# Patient Record
Sex: FEMALE | Race: WHITE | NOT HISPANIC OR LATINO | Employment: FULL TIME | URBAN - METROPOLITAN AREA
[De-identification: names, ages, dates, MRNs, and addresses within clinical notes are randomized per-mention and may not be internally consistent; named-entity substitution may affect disease eponyms.]

---

## 2017-11-27 ENCOUNTER — HOSPITAL ENCOUNTER (OUTPATIENT)
Dept: MAMMOGRAPHY | Facility: HOSPITAL | Age: 47
Discharge: HOME | End: 2017-11-27
Attending: OBSTETRICS & GYNECOLOGY | Admitting: OBSTETRICS & GYNECOLOGY

## 2017-11-27 DIAGNOSIS — R92.8 ABNORMAL MAMMOGRAM: ICD-10-CM

## 2017-11-27 PROCEDURE — G0279 TOMOSYNTHESIS, MAMMO: HCPCS

## 2017-11-27 PROCEDURE — 77066 DX MAMMO INCL CAD BI: CPT | Performed by: RADIOLOGY

## 2017-11-27 PROCEDURE — 77062 BREAST TOMOSYNTHESIS BI: CPT | Performed by: RADIOLOGY

## 2017-11-27 PROCEDURE — G0204 DX MAMMO INCL CAD BI: HCPCS

## 2018-10-03 ENCOUNTER — TRANSCRIBE ORDERS (OUTPATIENT)
Dept: ADMINISTRATIVE | Facility: HOSPITAL | Age: 48
End: 2018-10-03

## 2018-10-03 DIAGNOSIS — Z12.31 VISIT FOR SCREENING MAMMOGRAM: Primary | ICD-10-CM

## 2018-12-20 ENCOUNTER — APPOINTMENT (OUTPATIENT)
Dept: MAMMOGRAPHY | Facility: HOSPITAL | Age: 48
End: 2018-12-20
Attending: OBSTETRICS & GYNECOLOGY

## 2019-03-06 ENCOUNTER — HOSPITAL ENCOUNTER (OUTPATIENT)
Dept: MAMMOGRAPHY | Facility: HOSPITAL | Age: 49
Discharge: HOME | End: 2019-03-06
Attending: OBSTETRICS & GYNECOLOGY | Admitting: OBSTETRICS & GYNECOLOGY

## 2019-03-06 DIAGNOSIS — Z12.31 VISIT FOR SCREENING MAMMOGRAM: ICD-10-CM

## 2019-03-06 PROCEDURE — 77067 SCR MAMMO BI INCL CAD: CPT | Performed by: RADIOLOGY

## 2019-03-06 PROCEDURE — 77067 SCR MAMMO BI INCL CAD: CPT

## 2019-03-06 PROCEDURE — 77063 BREAST TOMOSYNTHESIS BI: CPT

## 2019-03-06 PROCEDURE — 77063 BREAST TOMOSYNTHESIS BI: CPT | Performed by: RADIOLOGY

## 2020-02-04 ENCOUNTER — TRANSCRIBE ORDERS (OUTPATIENT)
Dept: ADMINISTRATIVE | Facility: HOSPITAL | Age: 50
End: 2020-02-04

## 2020-02-04 DIAGNOSIS — Z12.31 VISIT FOR SCREENING MAMMOGRAM: Primary | ICD-10-CM

## 2020-05-14 ENCOUNTER — APPOINTMENT (OUTPATIENT)
Dept: MAMMOGRAPHY | Facility: HOSPITAL | Age: 50
End: 2020-05-14

## 2020-09-11 ENCOUNTER — TRANSCRIBE ORDERS (OUTPATIENT)
Dept: ADMINISTRATIVE | Facility: HOSPITAL | Age: 50
End: 2020-09-11

## 2020-09-11 DIAGNOSIS — Z12.31 VISIT FOR SCREENING MAMMOGRAM: Primary | ICD-10-CM

## 2020-10-15 ENCOUNTER — CONSULT (OUTPATIENT)
Dept: CARDIOLOGY | Facility: CLINIC | Age: 50
End: 2020-10-15

## 2020-10-15 VITALS
TEMPERATURE: 96.9 F | BODY MASS INDEX: 31.65 KG/M2 | SYSTOLIC BLOOD PRESSURE: 138 MMHG | DIASTOLIC BLOOD PRESSURE: 84 MMHG | WEIGHT: 190 LBS | HEIGHT: 65 IN | HEART RATE: 53 BPM | OXYGEN SATURATION: 99 %

## 2020-10-15 DIAGNOSIS — E78.5 DYSLIPIDEMIA: ICD-10-CM

## 2020-10-15 DIAGNOSIS — R94.31 ABNORMAL EKG: Primary | ICD-10-CM

## 2020-10-15 PROCEDURE — 99243 OFF/OP CNSLTJ NEW/EST LOW 30: CPT | Performed by: PHYSICIAN ASSISTANT

## 2020-10-15 PROCEDURE — 93000 ELECTROCARDIOGRAM COMPLETE: CPT | Performed by: PHYSICIAN ASSISTANT

## 2020-10-15 RX ORDER — ASPIRIN 81 MG/1
81 TABLET ORAL DAILY
COMMUNITY

## 2020-10-15 RX ORDER — QUINIDINE SULFATE 200 MG
125 TABLET ORAL DAILY
COMMUNITY

## 2020-10-15 RX ORDER — PHENOL 1.4 %
600 AEROSOL, SPRAY (ML) MUCOUS MEMBRANE DAILY
COMMUNITY

## 2020-10-15 RX ORDER — THIAMINE HCL 100 MG
2500 TABLET ORAL DAILY
COMMUNITY

## 2020-10-15 RX ORDER — HYDROXYCHLOROQUINE SULFATE 200 MG/1
200 TABLET, FILM COATED ORAL DAILY
COMMUNITY

## 2020-10-15 NOTE — PROGRESS NOTES
"     [facility] - Office Note  Andres Gómez         [address] [place] [address] [address] [place] [address]  1970   [phone] (home)      LOCATION:  [facility].  Visit Type: Consult.    PCP:  Bere Marvin MD    10/15/20   Andres Gómez is a 50 y.o.  female  currently employed.      Chief Complaint: Evaluation of abnormal EKG    Problem List/PMHx:   1.  Abnormal EKG  2.  Dyslipidemia  3.  Family Hx Sjogren's       No Known Allergies      Current Outpatient Medications:   •  aspirin 81 MG EC tablet, Take 81 mg by mouth Daily., Disp: , Rfl:   •  calcium carbonate (OS-ROXANA) 600 MG tablet, Take 600 mg by mouth Daily., Disp: , Rfl:   •  hydroxychloroquine (PLAQUENIL) 200 MG tablet, Take 200 mg by mouth Daily., Disp: , Rfl:   •  Multiple Vitamins-Minerals (Vitamin D3 Complete) tablet, Take 125 mcg by mouth Daily., Disp: , Rfl:   •  vitamin B-12 (CYANOCOBALAMIN) 2500 MCG sublingual tablet tablet, Place 2,500 mcg under the tongue Daily., Disp: , Rfl:     HPI  Andres Gómez is here today in consultation for EKG changes/abnormal EKG.  She was following with her PCP and EKG performed showed a new finding of \"ST depression.\"  Her EKG today shows same non specific abnormality.  She states she has never had complaints of chest pain, no anginal equivalent dyspnea or weakness.  She denies any change to her routine health and activity.  She had a stress test ~ 2010 for an \"irregular heart beat\" and reports that it was normal.  She is not aware of any family history of CAD or cardiac risk factors.  She recently had blood work with her PCP and was notified that her lipid panel was abnormal.  PCP wants to start a statin but the patient is hesitant and wants to further discuss.            The following portions of the patient's history were reviewed in the chart and updated as appropriate: allergies, current medications, past family history, past medical history, past social " "history, past surgical history and problem list.    Review of Systems   Constitution: Negative for fever, malaise/fatigue and weight loss.   Cardiovascular: Negative for chest pain, dyspnea on exertion, leg swelling and palpitations.   Respiratory: Negative for cough and shortness of breath.    Musculoskeletal: Positive for arthritis.   Neurological: Negative for dizziness and headaches.   All other systems reviewed and are negative.            height is 165.1 cm (65\") and weight is 86.2 kg (190 lb). Her temperature is 96.9 °F (36.1 °C). Her blood pressure is 138/84 and her pulse is 53. Her oxygen saturation is 99%.   Constitutional:       Appearance: Normal appearance. Well-developed.   Eyes:      General: Lids are normal.      Conjunctiva/sclera: Conjunctivae normal.      Right eye: Right conjunctiva is not injected.      Left eye: Left conjunctiva is not injected.      Pupils: Pupils are equal, round, and reactive to light.   HENT:      Head: Normocephalic and atraumatic.      Right Ear: Hearing and external ear normal.      Left Ear: Hearing and external ear normal.      Nose: Nose normal. No septal deviation.   Neck:      Musculoskeletal: Normal range of motion. No edema or erythema.      Thyroid: No thyroid mass or thyromegaly.      Vascular: No carotid bruit or JVD.   Pulmonary:      Effort: Pulmonary effort is normal. No respiratory distress.      Breath sounds: Normal breath sounds. No decreased breath sounds. No wheezing. No rhonchi. No rales.   Cardiovascular:      Normal rate. Regular rhythm.   Abdominal:      General: Bowel sounds are normal. There is no abdominal bruit.      Palpations: Abdomen is soft. There is no abdominal mass.      Tenderness: There is no abdominal tenderness.   Musculoskeletal: Normal range of motion.      Extremities: No clubbing present.  Skin:     General: Skin is warm and dry. There is no cyanosis.   Neurological:      Mental Status: Alert.   Psychiatric:         Speech: " Speech normal.             ECG 12 Lead    Date/Time: 10/15/2020 1:31 PM  Performed by: Yu Mccurdy PA  Authorized by: Yu Mccurdy PA   Comparison: not compared with previous ECG   Rhythm: sinus rhythm  Rate: normal  QRS axis: normal  Other findings: non-specific ST-T wave changes    Clinical impression: non-specific ECG             Assessment/ Plan   Diagnoses and all orders for this visit:    1. Abnormal EKG (Primary):  This is a 51 yo CF with new finding of dyslipidemia with abnormal EKG. She underwent stress testing ~10 years ago without further work up.  Her EKG findings show mild ST depression in lateral leads with inverted t waves.  At this time, I feel that an ischemic evaluation is warranted.  Will proceed with  Exercise nuclear stress test and have her follow up in 6 week with repeat EKG.    2. Dyslipidemia: , TG 71, , HDL 58.  Patient is hesitant to start statin Rx by PCP.  At this time, I discussed with her that I do think it is appropriate to monitor diet/lose weight/exercise and re check lipid panel.  If those actions don't help reducing lipids, then I agree with proceeding with statin.        Yu Mccurdy PA-C functioning independently.  10/15/2020 13:31 EDT

## 2020-11-27 ENCOUNTER — HOSPITAL ENCOUNTER (OUTPATIENT)
Dept: MAMMOGRAPHY | Facility: HOSPITAL | Age: 50
Discharge: HOME | End: 2020-11-27
Admitting: OBSTETRICS & GYNECOLOGY

## 2020-11-27 DIAGNOSIS — Z12.31 VISIT FOR SCREENING MAMMOGRAM: ICD-10-CM

## 2020-11-27 PROCEDURE — 77063 BREAST TOMOSYNTHESIS BI: CPT | Performed by: RADIOLOGY

## 2020-11-27 PROCEDURE — 77063 BREAST TOMOSYNTHESIS BI: CPT

## 2020-11-27 PROCEDURE — 77067 SCR MAMMO BI INCL CAD: CPT | Performed by: RADIOLOGY

## 2020-11-27 PROCEDURE — 77067 SCR MAMMO BI INCL CAD: CPT

## 2021-07-03 PROCEDURE — U0004 COV-19 TEST NON-CDC HGH THRU: HCPCS | Performed by: EMERGENCY MEDICINE

## 2021-08-16 PROCEDURE — U0004 COV-19 TEST NON-CDC HGH THRU: HCPCS | Performed by: NURSE PRACTITIONER

## 2021-08-17 PROCEDURE — U0004 COV-19 TEST NON-CDC HGH THRU: HCPCS | Performed by: NURSE PRACTITIONER

## 2021-11-04 ENCOUNTER — TRANSCRIBE ORDERS (OUTPATIENT)
Dept: ADMINISTRATIVE | Facility: HOSPITAL | Age: 51
End: 2021-11-04

## 2021-11-04 DIAGNOSIS — Z12.31 ENCOUNTER FOR SCREENING MAMMOGRAM FOR MALIGNANT NEOPLASM OF BREAST: Primary | ICD-10-CM

## 2021-12-17 ENCOUNTER — HOSPITAL ENCOUNTER (OUTPATIENT)
Dept: MAMMOGRAPHY | Facility: HOSPITAL | Age: 51
Discharge: HOME | End: 2021-12-17
Admitting: OBSTETRICS & GYNECOLOGY

## 2021-12-17 DIAGNOSIS — Z12.31 ENCOUNTER FOR SCREENING MAMMOGRAM FOR MALIGNANT NEOPLASM OF BREAST: ICD-10-CM

## 2021-12-17 PROCEDURE — 77067 SCR MAMMO BI INCL CAD: CPT

## 2021-12-17 PROCEDURE — 77063 BREAST TOMOSYNTHESIS BI: CPT | Performed by: RADIOLOGY

## 2021-12-17 PROCEDURE — 77063 BREAST TOMOSYNTHESIS BI: CPT

## 2021-12-17 PROCEDURE — 77067 SCR MAMMO BI INCL CAD: CPT | Performed by: RADIOLOGY

## 2023-02-23 ENCOUNTER — TRANSCRIBE ORDERS (OUTPATIENT)
Dept: ADMINISTRATIVE | Facility: HOSPITAL | Age: 53
End: 2023-02-23
Payer: COMMERCIAL

## 2023-02-23 DIAGNOSIS — Z12.31 VISIT FOR SCREENING MAMMOGRAM: Primary | ICD-10-CM

## 2023-03-31 ENCOUNTER — HOSPITAL ENCOUNTER (OUTPATIENT)
Dept: MAMMOGRAPHY | Facility: HOSPITAL | Age: 53
Discharge: HOME | End: 2023-03-31
Admitting: OBSTETRICS & GYNECOLOGY
Payer: COMMERCIAL

## 2023-03-31 DIAGNOSIS — Z12.31 VISIT FOR SCREENING MAMMOGRAM: ICD-10-CM

## 2023-03-31 PROCEDURE — 77067 SCR MAMMO BI INCL CAD: CPT

## 2023-03-31 PROCEDURE — 77067 SCR MAMMO BI INCL CAD: CPT | Performed by: RADIOLOGY

## 2023-03-31 PROCEDURE — 77063 BREAST TOMOSYNTHESIS BI: CPT | Performed by: RADIOLOGY

## 2023-03-31 PROCEDURE — 77063 BREAST TOMOSYNTHESIS BI: CPT

## 2024-03-13 ENCOUNTER — TRANSCRIBE ORDERS (OUTPATIENT)
Dept: ADMINISTRATIVE | Facility: HOSPITAL | Age: 54
End: 2024-03-13
Payer: COMMERCIAL

## 2024-03-13 DIAGNOSIS — Z12.31 VISIT FOR SCREENING MAMMOGRAM: Primary | ICD-10-CM

## 2024-05-01 ENCOUNTER — HOSPITAL ENCOUNTER (OUTPATIENT)
Dept: MAMMOGRAPHY | Facility: HOSPITAL | Age: 54
Discharge: HOME | End: 2024-05-01
Admitting: OBSTETRICS & GYNECOLOGY
Payer: COMMERCIAL

## 2024-05-01 DIAGNOSIS — Z12.31 VISIT FOR SCREENING MAMMOGRAM: ICD-10-CM

## 2024-05-01 PROCEDURE — 77063 BREAST TOMOSYNTHESIS BI: CPT

## 2024-05-01 PROCEDURE — 77067 SCR MAMMO BI INCL CAD: CPT

## 2024-05-02 PROCEDURE — 77063 BREAST TOMOSYNTHESIS BI: CPT | Performed by: RADIOLOGY

## 2024-05-02 PROCEDURE — 77067 SCR MAMMO BI INCL CAD: CPT | Performed by: RADIOLOGY

## 2025-02-28 ENCOUNTER — TRANSCRIBE ORDERS (OUTPATIENT)
Dept: ADMINISTRATIVE | Facility: HOSPITAL | Age: 55
End: 2025-02-28
Payer: COMMERCIAL

## 2025-02-28 DIAGNOSIS — Z12.31 VISIT FOR SCREENING MAMMOGRAM: Primary | ICD-10-CM

## 2025-04-17 LAB
NCCN CRITERIA FLAG: NORMAL
TYRER CUZICK SCORE: 7.3

## 2025-05-02 ENCOUNTER — HOSPITAL ENCOUNTER (OUTPATIENT)
Dept: MAMMOGRAPHY | Facility: HOSPITAL | Age: 55
Discharge: HOME | End: 2025-05-02
Admitting: OBSTETRICS & GYNECOLOGY
Payer: COMMERCIAL

## 2025-05-02 DIAGNOSIS — Z12.31 VISIT FOR SCREENING MAMMOGRAM: ICD-10-CM

## 2025-05-02 PROCEDURE — 77063 BREAST TOMOSYNTHESIS BI: CPT

## 2025-05-02 PROCEDURE — 77067 SCR MAMMO BI INCL CAD: CPT

## 2025-05-23 PROCEDURE — 87086 URINE CULTURE/COLONY COUNT: CPT | Performed by: FAMILY MEDICINE

## 2025-05-26 ENCOUNTER — PATIENT ROUNDING (BHMG ONLY) (OUTPATIENT)
Dept: URGENT CARE | Facility: CLINIC | Age: 55
End: 2025-05-26
Payer: COMMERCIAL

## 2025-06-30 ENCOUNTER — OFFICE VISIT (OUTPATIENT)
Dept: INTERNAL MEDICINE | Facility: CLINIC | Age: 55
End: 2025-06-30
Payer: COMMERCIAL

## 2025-06-30 VITALS
DIASTOLIC BLOOD PRESSURE: 88 MMHG | BODY MASS INDEX: 31.65 KG/M2 | WEIGHT: 190 LBS | HEART RATE: 84 BPM | TEMPERATURE: 97.4 F | HEIGHT: 65 IN | SYSTOLIC BLOOD PRESSURE: 146 MMHG | RESPIRATION RATE: 16 BRPM | OXYGEN SATURATION: 98 %

## 2025-06-30 DIAGNOSIS — R10.84 GENERALIZED ABDOMINAL PAIN: ICD-10-CM

## 2025-06-30 DIAGNOSIS — R10.11 RIGHT UPPER QUADRANT ABDOMINAL PAIN: ICD-10-CM

## 2025-06-30 DIAGNOSIS — R03.0 ELEVATED BP WITHOUT DIAGNOSIS OF HYPERTENSION: ICD-10-CM

## 2025-06-30 DIAGNOSIS — Z76.89 ENCOUNTER TO ESTABLISH CARE WITH NEW DOCTOR: Primary | ICD-10-CM

## 2025-06-30 PROBLEM — L92.0 GRANULOMA ANNULARE: Status: ACTIVE | Noted: 2025-06-30

## 2025-06-30 PROCEDURE — 99214 OFFICE O/P EST MOD 30 MIN: CPT | Performed by: FAMILY MEDICINE

## 2025-06-30 NOTE — PATIENT INSTRUCTIONS
Diagnosis Discussed   Continue to monitor   Plenty of fluids  CT abdomen/Pelvis ordered for further evaluation   Will get records from previous providers   If symptoms worsen or persist please seek further evaluation

## 2025-06-30 NOTE — PROGRESS NOTES
Office Note     Name: Andres Gómez    : 1970     MRN: [id number]     Chief Complaint  Establish Care (Pt states abdominal pain on and off x 2 months, did have some vomiting) and Abdominal Pain    Subjective     History of Present Illness:  Andres Gómez is a 54 y.o. female who presents today for establish care-   Concerns for on and off abdominal pain x 2 months   Recent colonoscopy - screening     Right flank, abdominal pain   Sharp stabbing comes and goes   9/10 x 1 day   Nausea and vomiting  Denies fevers, chills, chest pain, sob   Unable to attribute pain to food     Mild GERD -     PMH-  HPL   Migraines   Chronic neck pain   Systemic sclerosis     Meds-  Aspirin 81mg daily   Calcium   Vitamin D   Vitamin B12   Multivitamin   Hydroxychloroquine 200mg daily     Family Hx-   Maternal GM- - thyroid disease, pacemaker   Maternal GF- - polycystic kidney disease   Paternal GM- - unsure   Paternal GF- - lung cancer   Mother- - sjogrens, pulmonary fibrosis   Father- alive- HTN, arrhythmia, colon polyps   Brother- alive- Healthy   Sister- alive- MVP. Autoimmune     Alcohol none   Smoking none   Drug use  none   Job retired- [employer]   Stress - 7/10   Sun Exposure - will try to protect- no dermatology     Dental/Eye exams - up to date. Vision is stable   Diet/Exercise- Diet- Diet- Healthy- moderate working on reducing snacks, and caffeine   Exercise- limited. Will work on more activity     OBGYN - Dr. Marley   PAP - up to date   Mammo completed   DEXA - not yet   BC/Hormone replacement - none   Vitamin D - supplement daily     Colonoscopy/Cologuard Dr. Bundy- [facility]   Will request records 2025  Every 5 years     Immunizations  Tdap- will check records   Flu- postponed   Shingles- not yet   COVID- no recent boosters   PNA- not yet     Review of Systems:   Review of Systems   Constitutional:  Negative for chills and fever.   HENT:   Negative for trouble swallowing and voice change.    Eyes:  Negative for visual disturbance.   Respiratory:  Negative for cough, chest tightness, shortness of breath and wheezing.    Cardiovascular:  Negative for chest pain, palpitations and leg swelling.   Gastrointestinal:  Positive for abdominal pain, nausea and vomiting. Negative for constipation and diarrhea.        Resolving from previous- but now different post colonoscopy    Genitourinary:  Positive for flank pain. Negative for dysuria.   Musculoskeletal:  Negative for gait problem.   Skin:  Negative for rash.   Neurological:  Negative for light-headedness and headache.   Psychiatric/Behavioral:  Negative for dysphoric mood.        Past Medical History:   Past Medical History:   Diagnosis Date    Acid reflux     Arrhythmia     Colon polyp 2018    Benign polyp removed - Dr Gregor Rea    Hyperlipidemia        Past Surgical History:   Past Surgical History:   Procedure Laterality Date    BREAST BIOPSY Right     Stereo     CARDIAC CATHETERIZATION      COLONOSCOPY  06/27/25    Benign polyp removed - Dr Gregor [facility]       Immunizations:   Immunization History   Administered Date(s) Administered    COVID-19 ([facility] 09/28/2021        Medications:     Current Outpatient Medications:     aspirin 81 MG EC tablet, Take 1 tablet by mouth Daily., Disp: , Rfl:     calcium carbonate (OS-ROXANA) 600 MG tablet, Take 1 tablet by mouth Daily., Disp: , Rfl:     hydroxychloroquine (PLAQUENIL) 200 MG tablet, Take 1 tablet by mouth Daily., Disp: , Rfl:     Multiple Vitamins-Minerals (Vitamin D3 Complete) tablet, Take 125 mcg by mouth Daily., Disp: , Rfl:     vitamin B-12 (CYANOCOBALAMIN) 2500 MCG sublingual tablet tablet, Place 2,500 mcg under the tongue Daily., Disp: , Rfl:     Allergies:   No Known Allergies    Family History:   Family History   Problem Relation Age of Onset    Pulmonary fibrosis Mother     Sjogren's syndrome Mother     Other Mother      "    Pulmonary Fibrosis - Sjogren’s Syndrome    Arrhythmia Father     Hypertension Father     Colonic polyp Father     Mitral valve prolapse Sister     Autoimmune disease Sister     Thyroid disease Maternal Grandmother     Other (pacemaker) Maternal Grandmother     Kidney disease Maternal Grandfather         Polycistic Kidney Disease    Lung cancer Paternal Grandfather     Breast cancer Other         mat GGM    Endometrial cancer Neg Hx     Ovarian cancer Neg Hx        Social History:   Social History     Socioeconomic History    Marital status:    Tobacco Use    Smoking status: Never    Smokeless tobacco: Never   Vaping Use    Vaping status: Never Used   Substance and Sexual Activity    Alcohol use: Never    Drug use: Never    Sexual activity: Yes     Partners: Male     Birth control/protection: Post-menopausal, Vasectomy         Objective     Vital Signs  /88   Pulse 84   Temp 97.4 °F (36.3 °C)   Resp 16   Ht 165.1 cm (65\")   Wt 86.2 kg (190 lb)   SpO2 98%   BMI 31.62 kg/m²   Estimated body mass index is 31.62 kg/m² as calculated from the following:    Height as of this encounter: 165.1 cm (65\").    Weight as of this encounter: 86.2 kg (190 lb).    BMI is >= 30 and <35. (Class 1 Obesity). The following options were offered after discussion;: exercise counseling/recommendations and nutrition counseling/recommendations      Physical Exam  Vitals and nursing note reviewed.   Constitutional:       General: She is not in acute distress.     Appearance: Normal appearance.   HENT:      Head: Normocephalic and atraumatic.   Cardiovascular:      Rate and Rhythm: Normal rate and regular rhythm.      Heart sounds: Normal heart sounds.   Pulmonary:      Effort: Pulmonary effort is normal. No respiratory distress.      Breath sounds: Normal breath sounds.   Abdominal:      General: Bowel sounds are normal.      Palpations: Abdomen is soft.   Skin:     General: Skin is warm and dry.   Neurological:      " General: No focal deficit present.      Mental Status: She is alert and oriented to person, place, and time.          Procedures     Assessment and Plan   Diagnosis Discussed   Continue to monitor   Plenty of fluids  CT abdomen/Pelvis ordered for further evaluation   Will get records from previous providers   If symptoms worsen or persist please seek further evaluation     1. Encounter to establish care with new doctor  Reviewed PMH, medications and specialists     2. Right upper quadrant abdominal pain  - CT Abdomen Pelvis Without Contrast; Future    3. Generalized abdominal pain  - CT Abdomen Pelvis Without Contrast; Future    4. Elevated BP without diagnosis of hypertension  - CT Abdomen Pelvis Without Contrast; Future       Follow Up  Return in about 2 weeks (around 7/14/2025), or if symptoms worsen or fail to improve, for Annual, fasting labs.    Araceli Frederick MD  [facility]  [address] [place] [id number] [phone]   PAST SURGICAL HISTORY:  S/P cystoscopy with ureteral stent placement (L) Nov '2019    Ureteral calculus cystoscopy, ureteroscopy , 01/2020

## 2025-07-12 ENCOUNTER — HOSPITAL ENCOUNTER (OUTPATIENT)
Dept: CT IMAGING | Facility: HOSPITAL | Age: 55
Discharge: HOME | End: 2025-07-12
Payer: COMMERCIAL

## 2025-07-12 DIAGNOSIS — R03.0 ELEVATED BP WITHOUT DIAGNOSIS OF HYPERTENSION: ICD-10-CM

## 2025-07-12 DIAGNOSIS — R10.11 RIGHT UPPER QUADRANT ABDOMINAL PAIN: ICD-10-CM

## 2025-07-12 DIAGNOSIS — R10.84 GENERALIZED ABDOMINAL PAIN: ICD-10-CM

## 2025-07-12 PROCEDURE — 74176 CT ABD & PELVIS W/O CONTRAST: CPT

## 2025-07-17 ENCOUNTER — OFFICE VISIT (OUTPATIENT)
Dept: INTERNAL MEDICINE | Facility: CLINIC | Age: 55
End: 2025-07-17
Payer: COMMERCIAL

## 2025-07-17 ENCOUNTER — LAB (OUTPATIENT)
Dept: LAB | Facility: HOSPITAL | Age: 55
End: 2025-07-17
Payer: COMMERCIAL

## 2025-07-17 VITALS
OXYGEN SATURATION: 99 % | RESPIRATION RATE: 16 BRPM | DIASTOLIC BLOOD PRESSURE: 84 MMHG | WEIGHT: 189 LBS | HEART RATE: 78 BPM | HEIGHT: 65 IN | BODY MASS INDEX: 31.49 KG/M2 | SYSTOLIC BLOOD PRESSURE: 136 MMHG

## 2025-07-17 DIAGNOSIS — Z11.59 NEED FOR HEPATITIS C SCREENING TEST: ICD-10-CM

## 2025-07-17 DIAGNOSIS — Z13.228 SCREENING FOR ENDOCRINE, NUTRITIONAL, METABOLIC AND IMMUNITY DISORDER: ICD-10-CM

## 2025-07-17 DIAGNOSIS — Z13.220 SCREENING, LIPID: ICD-10-CM

## 2025-07-17 DIAGNOSIS — R10.84 GENERALIZED ABDOMINAL PAIN: ICD-10-CM

## 2025-07-17 DIAGNOSIS — Z13.0 SCREENING FOR ENDOCRINE, NUTRITIONAL, METABOLIC AND IMMUNITY DISORDER: ICD-10-CM

## 2025-07-17 DIAGNOSIS — Z13.29 SCREENING FOR ENDOCRINE, NUTRITIONAL, METABOLIC AND IMMUNITY DISORDER: ICD-10-CM

## 2025-07-17 DIAGNOSIS — Z13.21 SCREENING FOR ENDOCRINE, NUTRITIONAL, METABOLIC AND IMMUNITY DISORDER: ICD-10-CM

## 2025-07-17 DIAGNOSIS — E78.5 DYSLIPIDEMIA: ICD-10-CM

## 2025-07-17 DIAGNOSIS — L92.0 GRANULOMA ANNULARE: ICD-10-CM

## 2025-07-17 DIAGNOSIS — M34.1 CREST SYNDROME: ICD-10-CM

## 2025-07-17 DIAGNOSIS — Z00.00 WELLNESS EXAMINATION: Primary | ICD-10-CM

## 2025-07-17 PROBLEM — R03.0 ELEVATED BP WITHOUT DIAGNOSIS OF HYPERTENSION: Status: ACTIVE | Noted: 2025-07-17

## 2025-07-17 LAB
ALBUMIN SERPL-MCNC: 4.6 G/DL (ref 3.5–5.2)
ALBUMIN/GLOB SERPL: 1.6 G/DL
ALP SERPL-CCNC: 69 U/L (ref 39–117)
ALT SERPL W P-5'-P-CCNC: 14 U/L (ref 1–33)
ANION GAP SERPL CALCULATED.3IONS-SCNC: 14.2 MMOL/L (ref 5–15)
AST SERPL-CCNC: 22 U/L (ref 1–32)
BILIRUB SERPL-MCNC: 0.9 MG/DL (ref 0–1.2)
BUN SERPL-MCNC: 14 MG/DL (ref 6–20)
BUN/CREAT SERPL: 12.1 (ref 7–25)
CALCIUM SPEC-SCNC: 10.3 MG/DL (ref 8.6–10.5)
CHLORIDE SERPL-SCNC: 103 MMOL/L (ref 98–107)
CHOLEST SERPL-MCNC: 246 MG/DL (ref 0–200)
CO2 SERPL-SCNC: 24.8 MMOL/L (ref 22–29)
CREAT SERPL-MCNC: 1.16 MG/DL (ref 0.57–1)
EGFRCR SERPLBLD CKD-EPI 2021: 55.4 ML/MIN/1.73
GLOBULIN UR ELPH-MCNC: 2.9 GM/DL
GLUCOSE SERPL-MCNC: 90 MG/DL (ref 65–99)
HCV AB SER QL: NORMAL
HDLC SERPL-MCNC: 66 MG/DL (ref 40–60)
LDLC SERPL CALC-MCNC: 166 MG/DL (ref 0–100)
LDLC/HDLC SERPL: 2.48 {RATIO}
POTASSIUM SERPL-SCNC: 4.1 MMOL/L (ref 3.5–5.2)
PROT SERPL-MCNC: 7.5 G/DL (ref 6–8.5)
SODIUM SERPL-SCNC: 142 MMOL/L (ref 136–145)
TRIGL SERPL-MCNC: 81 MG/DL (ref 0–150)
TSH SERPL DL<=0.05 MIU/L-ACNC: 1.31 UIU/ML (ref 0.27–4.2)
VLDLC SERPL-MCNC: 14 MG/DL (ref 5–40)

## 2025-07-17 PROCEDURE — 80050 GENERAL HEALTH PANEL: CPT

## 2025-07-17 PROCEDURE — 80061 LIPID PANEL: CPT

## 2025-07-17 PROCEDURE — 87086 URINE CULTURE/COLONY COUNT: CPT

## 2025-07-17 PROCEDURE — 86803 HEPATITIS C AB TEST: CPT

## 2025-07-17 PROCEDURE — 83036 HEMOGLOBIN GLYCOSYLATED A1C: CPT

## 2025-07-17 NOTE — PATIENT INSTRUCTIONS
Diagnosis Discussed   Continue to monitor   Plenty of fluids, monitor diet and exercise   Labs ordered will notify of results   Immunizations up to date   Follow up with Rheumatology   Follow up with GYN   Follow up with Dermatology   Will check and get record for colonoscopy - recently completed   Take medications as instructed  Follow up as directed   If symptoms worsen or persist please seek further evaluation

## 2025-07-17 NOTE — PROGRESS NOTES
Office Note     Name: Andres Gómez    : 1970     MRN: [id number]     Chief Complaint  Annual Exam (Pt to discuss recent CT scan, stomach pain, drainage, and skin concerns)    Subjective     History of Present Illness:  Andres Gómez is a 54 y.o. female who presents today for physical  CT scan results     Impression:  1.Gas locule in urinary bladder. Correlate for recent instrumentation or cystitis.  2.Otherwise, no acute process identified within abdomen/pelvis.  Still with stomach pain- pain across the stomach  Sudden pain. Pain in lower abdomen  Did improve after approx 40min     Denies any recent or current urinary symptoms     Post nasal drainage - seen by ENT   Hx of tinnitus   Drainage is year round- has not been on flonase     Skin concerns   Crest syndrome- follows with Rheumatology     PMH-  HPL   Migraines   Chronic neck pain   Systemic sclerosis   Elevated BP no HTN   CREST syndrome      Meds-  Aspirin 81mg daily   Calcium   Vitamin D   Vitamin B12   Multivitamin   Hydroxychloroquine 200mg daily      Family Hx-   Maternal GM- - thyroid disease, pacemaker   Maternal GF- - polycystic kidney disease   Paternal GM- - unsure   Paternal GF- - lung cancer   Mother- - sjogrens, pulmonary fibrosis   Father- alive- HTN, arrhythmia, colon polyps   Brother- alive- Healthy   Sister- alive- MVP. Autoimmune      Alcohol none   Smoking none   Drug use  none   Job retired- [employer]   Stress - 7/10   Sun Exposure - will try to protect- no dermatology      Dental/Eye exams - up to date. Vision is stable   Diet/Exercise- Diet- Diet- Healthy- moderate working on reducing snacks, and caffeine   Exercise- limited. Will work on more activity      OBGYN - Dr. Marley   PAP - up to date   Mammo completed   DEXA - not yet   BC/Hormone replacement - none   Vitamin D - supplement daily      Colonoscopy/Cologuard Dr. Bundy- [facility]   Will request  records 6/2025  Every 5 years      Immunizations  Tdap- will check records   Flu- postponed   Shingles- not yet   COVID- no recent boosters   PNA- not yet     I spent 20 minutes on the separately reported service . This time is not included in the time used to support the E/M service also reported today.  This time includes activities preparing for the visit, reviewing test, reviewing history and performing an appropriate examination. Discussing with the patient and reviewing and independently interpreting the diagnostic studies, communicating that information to the patient along with discussing care coordination and referrals if needed and documenting information in the medical records.     Review of Systems:   Review of Systems   Constitutional:  Negative for chills and fever.   HENT:  Positive for postnasal drip. Negative for dental problem, trouble swallowing and voice change.    Eyes:  Negative for visual disturbance.   Respiratory:  Negative for cough, choking, chest tightness, shortness of breath and wheezing.    Cardiovascular:  Negative for chest pain, palpitations and leg swelling.   Gastrointestinal:  Positive for abdominal pain. Negative for blood in stool, constipation, diarrhea, nausea, vomiting and GERD.   Genitourinary:  Negative for dysuria.   Musculoskeletal:  Negative for gait problem.   Skin:  Negative for rash.   Neurological:  Negative for light-headedness and headache.   Psychiatric/Behavioral:  Negative for dysphoric mood.        Past Medical History:   Past Medical History:   Diagnosis Date    Acid reflux     Arrhythmia     Colon polyp 2018    Benign polyp removed - Dr Bundy  [facility]    Hyperlipidemia        Past Surgical History:   Past Surgical History:   Procedure Laterality Date    BREAST BIOPSY Right     Stereo     CARDIAC CATHETERIZATION      COLONOSCOPY  06/27/25    Benign polyp removed - Dr Bundy  [facility]       Immunizations:   Immunization History  "  Administered Date(s) Administered    COVID-19 ([facility]) 09/28/2021        Medications:     Current Outpatient Medications:     aspirin 81 MG EC tablet, Take 1 tablet by mouth Daily., Disp: , Rfl:     calcium carbonate (OS-ROXANA) 600 MG tablet, Take 1 tablet by mouth Daily., Disp: , Rfl:     hydroxychloroquine (PLAQUENIL) 200 MG tablet, Take 1 tablet by mouth Daily., Disp: , Rfl:     Multiple Vitamins-Minerals (Vitamin D3 Complete) tablet, Take 125 mcg by mouth Daily., Disp: , Rfl:     vitamin B-12 (CYANOCOBALAMIN) 2500 MCG sublingual tablet tablet, Place 3,000 mcg under the tongue Daily., Disp: , Rfl:     vitamin D3 125 MCG (5000 UT) capsule capsule, Take 1 capsule by mouth Daily., Disp: , Rfl:     Allergies:   No Known Allergies    Family History:   Family History   Problem Relation Age of Onset    Pulmonary fibrosis Mother     Sjogren's syndrome Mother     Other Mother         Pulmonary Fibrosis - Sjogren’s Syndrome    Arrhythmia Father     Hypertension Father     Colonic polyp Father     Mitral valve prolapse Sister     Autoimmune disease Sister     Thyroid disease Maternal Grandmother     Other (pacemaker) Maternal Grandmother     Kidney disease Maternal Grandfather         Polycistic Kidney Disease    Lung cancer Paternal Grandfather     Breast cancer Other         mat GGM    Endometrial cancer Neg Hx     Ovarian cancer Neg Hx        Social History:   Social History     Socioeconomic History    Marital status:    Tobacco Use    Smoking status: Never    Smokeless tobacco: Never   Vaping Use    Vaping status: Never Used   Substance and Sexual Activity    Alcohol use: Never    Drug use: Never    Sexual activity: Yes     Partners: Male     Birth control/protection: Post-menopausal, Vasectomy         Objective     Vital Signs  /84   Pulse 78   Resp 16   Ht 165.1 cm (65\")   Wt 85.7 kg (189 lb)   SpO2 99%   BMI 31.45 kg/m²   Estimated body mass index is 31.45 kg/m² as calculated from the " "following:    Height as of this encounter: 165.1 cm (65\").    Weight as of this encounter: 85.7 kg (189 lb).            Physical Exam  Vitals and nursing note reviewed.   Constitutional:       General: She is not in acute distress.     Appearance: Normal appearance. She is obese.   HENT:      Head: Normocephalic and atraumatic.      Right Ear: Tympanic membrane normal.      Left Ear: Tympanic membrane normal.      Nose: Nose normal.      Mouth/Throat:      Mouth: Mucous membranes are moist.   Eyes:      Extraocular Movements: Extraocular movements intact.      Conjunctiva/sclera: Conjunctivae normal.      Pupils: Pupils are equal, round, and reactive to light.   Cardiovascular:      Rate and Rhythm: Normal rate and regular rhythm.      Heart sounds: Normal heart sounds.   Pulmonary:      Effort: Pulmonary effort is normal. No respiratory distress.      Breath sounds: Normal breath sounds.   Abdominal:      General: Bowel sounds are normal.      Palpations: Abdomen is soft.      Tenderness: There is no abdominal tenderness.   Musculoskeletal:         General: Normal range of motion.      Cervical back: Normal range of motion.      Comments: Moving all extremities    Skin:     General: Skin is warm and dry.   Neurological:      General: No focal deficit present.      Mental Status: She is alert and oriented to person, place, and time.   Psychiatric:         Mood and Affect: Mood normal.         Behavior: Behavior normal.         Thought Content: Thought content normal.         Judgment: Judgment normal.          Procedures     Assessment and Plan   Diagnosis Discussed   Continue to monitor   Plenty of fluids, monitor diet and exercise   Labs ordered will notify of results   Immunizations up to date   Follow up with Rheumatology   Follow up with GYN   Follow up with Dermatology   Will check and get record for colonoscopy - recently completed   Take medications as instructed  Follow up as directed   If symptoms worsen " or persist please seek further evaluation     1. Wellness examination    2. Screening for endocrine, nutritional, metabolic and immunity disorder  - CBC (No Diff); Future  - Comprehensive Metabolic Panel; Future  - Hemoglobin A1c; Future  - TSH Rfx On Abnormal To Free T4; Future    3. Need for hepatitis C screening test  - Hepatitis C Antibody; Future    4. Screening, lipid  - Lipid Panel; Future    5. Dyslipidemia  - Lipid Panel; Future    6. CREST syndrome  - Ambulatory Referral to Dermatology    7. Granuloma annulare  - Ambulatory Referral to Dermatology    8. Generalized abdominal pain  - Urine Culture - Urine, Urine, Clean Catch; Future  - POC Urinalysis Dipstick, Automated       Follow Up  Return in about 1 year (around 7/17/2026), or if symptoms worsen or fail to improve, for Annual, fasting labs.    Araceli Frederick MD  [facility]  [address] [place] [id number] [phone]

## 2025-07-18 LAB
DEPRECATED RDW RBC AUTO: 41.7 FL (ref 37–54)
ERYTHROCYTE [DISTWIDTH] IN BLOOD BY AUTOMATED COUNT: 12.1 % (ref 12.3–15.4)
HBA1C MFR BLD: 5.5 % (ref 4.8–5.6)
HCT VFR BLD AUTO: 43.4 % (ref 34–46.6)
HGB BLD-MCNC: 15.2 G/DL (ref 12–15.9)
MCH RBC QN AUTO: 33.1 PG (ref 26.6–33)
MCHC RBC AUTO-ENTMCNC: 35 G/DL (ref 31.5–35.7)
MCV RBC AUTO: 94.6 FL (ref 79–97)
PLATELET # BLD AUTO: 297 10*3/MM3 (ref 140–450)
PMV BLD AUTO: 12.3 FL (ref 6–12)
RBC # BLD AUTO: 4.59 10*6/MM3 (ref 3.77–5.28)
WBC NRBC COR # BLD AUTO: 4.32 10*3/MM3 (ref 3.4–10.8)

## 2025-07-19 LAB — BACTERIA SPEC AEROBE CULT: NORMAL
